# Patient Record
Sex: FEMALE | Race: WHITE | NOT HISPANIC OR LATINO | URBAN - METROPOLITAN AREA
[De-identification: names, ages, dates, MRNs, and addresses within clinical notes are randomized per-mention and may not be internally consistent; named-entity substitution may affect disease eponyms.]

---

## 2019-03-13 ENCOUNTER — EMERGENCY (EMERGENCY)
Facility: HOSPITAL | Age: 39
LOS: 1 days | Discharge: HOME | End: 2019-03-13
Attending: EMERGENCY MEDICINE | Admitting: EMERGENCY MEDICINE
Payer: COMMERCIAL

## 2019-03-13 VITALS
HEART RATE: 96 BPM | SYSTOLIC BLOOD PRESSURE: 128 MMHG | RESPIRATION RATE: 17 BRPM | TEMPERATURE: 98 F | OXYGEN SATURATION: 100 % | DIASTOLIC BLOOD PRESSURE: 78 MMHG

## 2019-03-13 VITALS
DIASTOLIC BLOOD PRESSURE: 99 MMHG | HEART RATE: 102 BPM | OXYGEN SATURATION: 100 % | SYSTOLIC BLOOD PRESSURE: 160 MMHG | TEMPERATURE: 98 F | RESPIRATION RATE: 118 BRPM

## 2019-03-13 LAB
ALBUMIN SERPL ELPH-MCNC: 4.4 G/DL — SIGNIFICANT CHANGE UP (ref 3.3–5)
ALP SERPL-CCNC: 68 U/L — SIGNIFICANT CHANGE UP (ref 40–120)
ALT FLD-CCNC: 14 U/L — SIGNIFICANT CHANGE UP (ref 4–33)
ANION GAP SERPL CALC-SCNC: 13 MMO/L — SIGNIFICANT CHANGE UP (ref 7–14)
APPEARANCE UR: CLEAR — SIGNIFICANT CHANGE UP
AST SERPL-CCNC: 18 U/L — SIGNIFICANT CHANGE UP (ref 4–32)
BACTERIA # UR AUTO: NEGATIVE — SIGNIFICANT CHANGE UP
BASE EXCESS BLDV CALC-SCNC: 6.6 MMOL/L — SIGNIFICANT CHANGE UP
BASOPHILS # BLD AUTO: 0.01 K/UL — SIGNIFICANT CHANGE UP (ref 0–0.2)
BASOPHILS NFR BLD AUTO: 0.2 % — SIGNIFICANT CHANGE UP (ref 0–2)
BILIRUB SERPL-MCNC: 0.4 MG/DL — SIGNIFICANT CHANGE UP (ref 0.2–1.2)
BILIRUB UR-MCNC: NEGATIVE — SIGNIFICANT CHANGE UP
BLOOD GAS VENOUS - CREATININE: 0.52 MG/DL — SIGNIFICANT CHANGE UP (ref 0.5–1.3)
BLOOD UR QL VISUAL: SIGNIFICANT CHANGE UP
BUN SERPL-MCNC: 10 MG/DL — SIGNIFICANT CHANGE UP (ref 7–23)
CALCIUM SERPL-MCNC: 9.7 MG/DL — SIGNIFICANT CHANGE UP (ref 8.4–10.5)
CHLORIDE BLDV-SCNC: 105 MMOL/L — SIGNIFICANT CHANGE UP (ref 96–108)
CHLORIDE SERPL-SCNC: 101 MMOL/L — SIGNIFICANT CHANGE UP (ref 98–107)
CO2 SERPL-SCNC: 26 MMOL/L — SIGNIFICANT CHANGE UP (ref 22–31)
COLOR SPEC: YELLOW — SIGNIFICANT CHANGE UP
CREAT SERPL-MCNC: 0.62 MG/DL — SIGNIFICANT CHANGE UP (ref 0.5–1.3)
EOSINOPHIL # BLD AUTO: 0.02 K/UL — SIGNIFICANT CHANGE UP (ref 0–0.5)
EOSINOPHIL NFR BLD AUTO: 0.3 % — SIGNIFICANT CHANGE UP (ref 0–6)
GAS PNL BLDV: 135 MMOL/L — LOW (ref 136–146)
GLUCOSE BLDV-MCNC: 115 — HIGH (ref 70–99)
GLUCOSE SERPL-MCNC: 113 MG/DL — HIGH (ref 70–99)
GLUCOSE UR-MCNC: NEGATIVE — SIGNIFICANT CHANGE UP
HCO3 BLDV-SCNC: 29 MMOL/L — HIGH (ref 20–27)
HCT VFR BLD CALC: 42.4 % — SIGNIFICANT CHANGE UP (ref 34.5–45)
HCT VFR BLDV CALC: 43.6 % — SIGNIFICANT CHANGE UP (ref 34.5–45)
HGB BLD-MCNC: 13.5 G/DL — SIGNIFICANT CHANGE UP (ref 11.5–15.5)
HGB BLDV-MCNC: 14.2 G/DL — SIGNIFICANT CHANGE UP (ref 11.5–15.5)
HYALINE CASTS # UR AUTO: NEGATIVE — SIGNIFICANT CHANGE UP
IMM GRANULOCYTES NFR BLD AUTO: 0.3 % — SIGNIFICANT CHANGE UP (ref 0–1.5)
KETONES UR-MCNC: SIGNIFICANT CHANGE UP
LACTATE BLDV-MCNC: 2.3 MMOL/L — HIGH (ref 0.5–2)
LEUKOCYTE ESTERASE UR-ACNC: NEGATIVE — SIGNIFICANT CHANGE UP
LYMPHOCYTES # BLD AUTO: 1.11 K/UL — SIGNIFICANT CHANGE UP (ref 1–3.3)
LYMPHOCYTES # BLD AUTO: 18.5 % — SIGNIFICANT CHANGE UP (ref 13–44)
MCHC RBC-ENTMCNC: 28.3 PG — SIGNIFICANT CHANGE UP (ref 27–34)
MCHC RBC-ENTMCNC: 31.8 % — LOW (ref 32–36)
MCV RBC AUTO: 88.9 FL — SIGNIFICANT CHANGE UP (ref 80–100)
MONOCYTES # BLD AUTO: 0.32 K/UL — SIGNIFICANT CHANGE UP (ref 0–0.9)
MONOCYTES NFR BLD AUTO: 5.3 % — SIGNIFICANT CHANGE UP (ref 2–14)
NEUTROPHILS # BLD AUTO: 4.53 K/UL — SIGNIFICANT CHANGE UP (ref 1.8–7.4)
NEUTROPHILS NFR BLD AUTO: 75.4 % — SIGNIFICANT CHANGE UP (ref 43–77)
NITRITE UR-MCNC: NEGATIVE — SIGNIFICANT CHANGE UP
NRBC # FLD: 0 K/UL — LOW (ref 25–125)
PCO2 BLDV: 53 MMHG — HIGH (ref 41–51)
PH BLDV: 7.4 PH — SIGNIFICANT CHANGE UP (ref 7.32–7.43)
PH UR: 8 — SIGNIFICANT CHANGE UP (ref 5–8)
PLATELET # BLD AUTO: 169 K/UL — SIGNIFICANT CHANGE UP (ref 150–400)
PMV BLD: 10.6 FL — SIGNIFICANT CHANGE UP (ref 7–13)
PO2 BLDV: 39 MMHG — SIGNIFICANT CHANGE UP (ref 35–40)
POTASSIUM BLDV-SCNC: 4.3 MMOL/L — SIGNIFICANT CHANGE UP (ref 3.4–4.5)
POTASSIUM SERPL-MCNC: 4.3 MMOL/L — SIGNIFICANT CHANGE UP (ref 3.5–5.3)
POTASSIUM SERPL-SCNC: 4.3 MMOL/L — SIGNIFICANT CHANGE UP (ref 3.5–5.3)
PROT SERPL-MCNC: 7.3 G/DL — SIGNIFICANT CHANGE UP (ref 6–8.3)
PROT UR-MCNC: 20 — SIGNIFICANT CHANGE UP
RBC # BLD: 4.77 M/UL — SIGNIFICANT CHANGE UP (ref 3.8–5.2)
RBC # FLD: 12.8 % — SIGNIFICANT CHANGE UP (ref 10.3–14.5)
RBC CASTS # UR COMP ASSIST: HIGH (ref 0–?)
SAO2 % BLDV: 70.8 % — SIGNIFICANT CHANGE UP (ref 60–85)
SODIUM SERPL-SCNC: 140 MMOL/L — SIGNIFICANT CHANGE UP (ref 135–145)
SP GR SPEC: 1.02 — SIGNIFICANT CHANGE UP (ref 1–1.04)
SQUAMOUS # UR AUTO: SIGNIFICANT CHANGE UP
TSH SERPL-MCNC: 1.07 UIU/ML — SIGNIFICANT CHANGE UP (ref 0.27–4.2)
UROBILINOGEN FLD QL: NORMAL — SIGNIFICANT CHANGE UP
VALPROATE SERPL-MCNC: 75.6 UG/ML — SIGNIFICANT CHANGE UP (ref 50–100)
WBC # BLD: 6.01 K/UL — SIGNIFICANT CHANGE UP (ref 3.8–10.5)
WBC # FLD AUTO: 6.01 K/UL — SIGNIFICANT CHANGE UP (ref 3.8–10.5)
WBC UR QL: SIGNIFICANT CHANGE UP (ref 0–?)

## 2019-03-13 PROCEDURE — 99283 EMERGENCY DEPT VISIT LOW MDM: CPT | Mod: 25

## 2019-03-13 PROCEDURE — 93010 ELECTROCARDIOGRAM REPORT: CPT

## 2019-03-13 NOTE — ED ADULT TRIAGE NOTE - CHIEF COMPLAINT QUOTE
pt coming MD office [facility] light headed, as per pt stated she usually has SZ after similar symptoms, pt stated taking all meds.  Hx. Seizure on Depokate ER (see list)

## 2019-03-13 NOTE — ED PROVIDER NOTE - CONSTITUTIONAL, MLM
NEUROLOGY CONSULTATION REPORT:

 

DATE OF CONSULT:  18

 

The patient is an inpatient.

 

REQUESTING PHYSICIAN:  Dr. Frankenberg.

 

REASON FOR CONSULT:  Possible left transverse sinus thrombosis, syncope.

 

HISTORY OF PRESENT ILLNESS:  Jm Alcala is a 62-year-old man with a 
history of polycythemia vera, migraine headaches, GERD and allergic rhinitis 
who was transferred from [facility] yesterday after a 
syncopal episode and there was concern for a nonocclusive thrombus in his left 
transverse sinus on plain head CT.  He was reportedly working outside in the 
heat with a chainsaw and says he was "overheated and under watered."  He began 
to feel overheated, went inside and sat on the couch when he was witnessed to 
lose consciousness by his ex-wife.  He reports that the room closed in around 
him and he has experienced this in the past with similar episodes, with the 
most recent being 2 years ago when he was outside doing some similar work in 
the heat.  He was reportedly not laid supine but kept sitting upright on the 
couch and they applied cold compresses to his neck as well as under his arms.  
He did not hear that he had any extremity shaking and no loss of bladder 
control or oral trauma.  He is not sure how long he was out and felt "fuzzy 
when he came to."  His family decided to bring him to the ER and a CT of his 
head was performed which showed concern for a possible nonocclusive thrombus in 
his left transverse sinus.  A phone consultation was obtained with Dr. Collado 
who was on call for Neurology yesterday and she recommended a dose of 
therapeutic Lovenox at [facility] which he received 100 mg subcutaneously and 
then he was transferred here for further imaging.  He had a CT venogram which 
demonstrated a filling defect in the left transverse sinus in the same area 
identified on the outside scan which seemed to be a typical location and 
appearance for an arachnoid granulation, though it was felt that sinus 
thrombosis was still within the differential.

 

Once he regained consciousness from his syncopal episode, he began to 
experience a headache which he says are typical for his migraine headaches.  
This was located frontally, retro-orbitally, as well as in his sinuses and was 
associated with light sensitivity.  He received hydromorphone and no longer has 
the headache today.  He typically gets these types of headaches associated with 
photophobia and phonophobia as well as vomiting a couple of times per year and 
typically treats them with Excedrin Migraine and lies down in a dark room until 
they pass.  Aside from occurring in the context of his syncopal episode, this 
headache was typical for his historical headaches.  Today he feels back to his 
baseline and is eager to go home.

 

PAST MEDICAL HISTORY:

1.  Polycythemia vera, last phlebotomized in March with recent monthly CBC's.  
The patient sees a physician in [place] for this.

2.  Migraine.

3.  GERD.

4.  Allergic rhinitis.

 

HOME MEDICATIONS:

1.  Aspirin 81 mg daily.

2.  Omeprazole 20 mg daily.

3.  Multivitamin with iron and folic acid daily.

4.  Zyrtec D 1 tablet daily.

5.  Normal saline at 150 mL per hour.

 

ALLERGIES:  MULTIPLE ANTIBIOTICS including AMPICILLIN, CEFACLOR, PENICILLIN, 
SULFA, and BACTRIM all of which cause hives.

 

FAMILY HISTORY:  His mother experienced a stroke and then a subdural hemorrhage 
after a fall.  His father  from colon cancer in his 60s.  His son 
reportedly had a hemorrhagic stroke in his early 30s.

 

SOCIAL HISTORY:  He does not smoke cigarettes.  Drinks 2 to 3 beers per week. 
Denies drug use.  He is a retired  and now works on his land 
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 
more than 50 years.

 

REVIEW OF SYSTEMS:  A 14-point review of systems was negative aside from as 
mentioned in the HPI.

 

PHYSICAL EXAM:  Vital Signs:  Temperature 97.8, blood pressure 153/108 with 
recent values of 139/94 at 7:15 this morning, 152/91 at 0300, and 141/95 at 
0158.  Heart rate 63, oxygen saturation 98% on room air.  General:  He is in no 
acute distress. Heart is in a regular rate and rhythm with no murmurs, rubs, or 
gallops.  Lungs are clear to auscultation bilaterally.  There are no carotid 
bruits.  There is no extremity edema. There are no skin rashes. His affect is 
appropriate.

 

On neurologic exam, he is fully awake, alert, and oriented.  Speech is clear 
without dysarthria or aphasia.

 

On cranial nerves testing; pupils are equal, round, and reactive from 4 to 2 mm 
bilaterally.  Versions are full without nystagmus.  Fields are full to 
confrontation.  Facial sensation and musculature is full and symmetric.  
Hearing is intact to voice.  Palate elevates symmetrically and tongue is 
midline.

 

On motor examination, he has full strength in the upper and lower extremities 
with no pronator drift.  Sensation is intact to light touch in the upper and 
lower extremities.  Reflexes were 2+ throughout with downgoing toes.  Finger-to-
nose and heel-to-shin were intact without ataxia.  I did not ambulate him, but 
he reports that he is steady on his feet.

 

DIAGNOSTIC STUDIES/LAB DATA:  CBC this morning shows a hemoglobin of 16.3, 
hematocrit of 47, MCH of 32, white count and platelet count normal.  His 
chemistry panel was entirely normal.

 

I reviewed his CT venogram which as mentioned shows a filling defect in the 
left transverse sinus which could be consistent with an arachnoid granulation 
versus a partial nonocclusive thrombus.  The left transverse sinus is dominant 
with a relatively hypoplastic right transverse sinus, though the right sinus 
does fill. There are no significant intracranial abnormalities.

 

IMPRESSION:  This is a 62-year-old man with a history of polycythemia vara who 
experienced an episode of syncope after being out in the heat and likely not 
well hydrated.  He has had similar episodes under similar circumstances in the 
past. However, given his history of polycythemia vera and his CT of the brain 
as well as CTV of the brain which raises possible concern for a nonocclusive 
thrombus in his left transverse sinus, he was transferred here for further 
evaluation and yesterday was treated with a therapeutic dose of Lovenox.  Today
, we are planning on evaluating further with MRV of the brain to see if we can 
further sort out whether this is normal anatomy versus a partial thrombus which 
would change his treatment course.  I let him know that most likely either way 
he will be able to go home after we obtain this testing.  The issue is whether 
he will go on anticoagulation or not.  If a partial nonocclusive thrombus is 
found in his left transverse sinus, then he should be placed on a therapeutic 
dose of Lovenox and sent home on that with followup with me next month as well 
as with his hematologist.  If this does not show a nonocclusive thrombus, then 
he can be sent home on his usual medications with education that he needs to 
stay better hydrated and take breaks when he is doing hard work outside in the 
heat. If he has prodromal syncopal symptoms, he should lay down flat.



Thank you for this consultation

 

 [id number]/[facility] #: [id number]

LETHA normal... Well appearing, well nourished, awake, alert, oriented to person, place, time/situation and in no apparent distress.

## 2019-03-13 NOTE — ED PROVIDER NOTE - OBJECTIVE STATEMENT
Pt is 37 y/o F w/ a PMHx of Sz d/o and Anxiety and depression who p/w fatigue and feeling lightheaded that had started since Monday. denies any LOC, has had prior in the past and noted that she felt like this prior to seizures. Pt denies new medication, no changes in vision, no ataxic gait, patient states that she feels so weak that it has been more difficult to walk. No N/V, no dysuria, no fevers. Denies CP but     PCP: Dr. Amarjit Stewart  Neurologist: Dr. Saud Kwan [phone]

## 2019-03-13 NOTE — ED PROVIDER NOTE - NSFOLLOWUPINSTRUCTIONS_ED_ALL_ED_FT
You were seen at [facility] ER for lightheadedness and fatigue and had normal blood work including blood counts, kidney and liver function, thyroid function, You had a normal urine test and EKG as well as well.     You are to follow-up with your primary care doctor and Neurologist in the next few days for further evaluation and treatment.     You should return for any episodes of fainting, inability to tolerate oral intake, seizures, inability to walk, or any other concerns of worsening symptoms.

## 2019-03-13 NOTE — ED PROVIDER NOTE - CLINICAL SUMMARY MEDICAL DECISION MAKING FREE TEXT BOX
Pt is 37 y/o F w/ a PMHx of Sz d/o and Anxiety and depression who p/w fatigue and feeling lightheaded Pt is 39 y/o F w/ a PMHx of Sz d/o and Anxiety and depression who p/w fatigue and feeling lightheaded low suspicion for Sz, possible metabolic electrolyte or hypothyroidism vs anxiety, no HI or SI on questioning but possibility of anxiety and depression related. Will get CBC, CMP, TSH, UA, urine preg, no need for imaging given normal exam. Likely will be discharged with follow-up with PCP and Neurologist. Kacey att: Pt is 37 y/o F w/ a PMHx of Sz d/o and Anxiety and depression who p/w fatigue and feeling lightheaded low suspicion for Sz, possible metabolic electrolyte or hypothyroidism vs anxiety, no HI or SI on questioning but possibility of anxiety and depression related. Will get CBC, CMP, TSH, UA, urine preg, no need for imaging given normal exam. Likely will be discharged with follow-up with PCP and Neurologist.

## 2019-03-13 NOTE — ED ADULT NURSE NOTE - OBJECTIVE STATEMENT
patient alert ox3 came in c/o weakness with lightheadedness. h/o seizures and states that this is how she feels before she gets a seizure. denies any pain. labs done as ordered .NAD. breathing even and unlabored. awaiting results and re eval.

## 2019-03-13 NOTE — ED ADULT NURSE NOTE - CHIEF COMPLAINT QUOTE
pt coming MD office [facility] light headed, as per pt stated she usually has SZ after similar symptoms, pt stated taking all meds.  Hx. Seizure on Depokate ER (see list)

## 2019-03-15 LAB
BACTERIA UR CULT: SIGNIFICANT CHANGE UP
SPECIMEN SOURCE: SIGNIFICANT CHANGE UP

## 2021-04-22 ENCOUNTER — NON-APPOINTMENT (OUTPATIENT)
Age: 41
End: 2021-04-22

## 2021-04-22 ENCOUNTER — APPOINTMENT (OUTPATIENT)
Dept: DERMATOLOGY | Facility: CLINIC | Age: 41
End: 2021-04-22
Payer: COMMERCIAL

## 2021-04-22 VITALS — BODY MASS INDEX: 31.41 KG/M2 | HEIGHT: 60 IN | WEIGHT: 160 LBS

## 2021-04-22 DIAGNOSIS — L68.0 HIRSUTISM: ICD-10-CM

## 2021-04-22 DIAGNOSIS — T50.905A HIRSUTISM: ICD-10-CM

## 2021-04-22 PROCEDURE — 99203 OFFICE O/P NEW LOW 30 MIN: CPT

## 2021-04-22 PROCEDURE — 99072 ADDL SUPL MATRL&STAF TM PHE: CPT

## 2021-04-22 RX ORDER — EFLORNITHINE HYDROCHLORIDE 139 MG/G
13.9 CREAM TOPICAL
Qty: 1 | Refills: 4 | Status: ACTIVE | COMMUNITY
Start: 2021-04-22 | End: 1900-01-01

## 2021-05-04 ENCOUNTER — NON-APPOINTMENT (OUTPATIENT)
Age: 41
End: 2021-05-04

## 2021-06-16 ENCOUNTER — NON-APPOINTMENT (OUTPATIENT)
Age: 41
End: 2021-06-16